# Patient Record
Sex: FEMALE | Race: WHITE | NOT HISPANIC OR LATINO
[De-identification: names, ages, dates, MRNs, and addresses within clinical notes are randomized per-mention and may not be internally consistent; named-entity substitution may affect disease eponyms.]

---

## 2017-10-26 ENCOUNTER — RESULT REVIEW (OUTPATIENT)
Age: 32
End: 2017-10-26

## 2018-03-05 ENCOUNTER — APPOINTMENT (OUTPATIENT)
Dept: ANTEPARTUM | Facility: CLINIC | Age: 33
End: 2018-03-05
Payer: COMMERCIAL

## 2018-03-05 ENCOUNTER — ASOB RESULT (OUTPATIENT)
Age: 33
End: 2018-03-05

## 2018-03-05 PROCEDURE — 76801 OB US < 14 WKS SINGLE FETUS: CPT

## 2018-03-05 PROCEDURE — 36416 COLLJ CAPILLARY BLOOD SPEC: CPT

## 2018-03-05 PROCEDURE — 76813 OB US NUCHAL MEAS 1 GEST: CPT

## 2018-05-02 ENCOUNTER — APPOINTMENT (OUTPATIENT)
Dept: ANTEPARTUM | Facility: CLINIC | Age: 33
End: 2018-05-02

## 2018-05-16 ENCOUNTER — ASOB RESULT (OUTPATIENT)
Age: 33
End: 2018-05-16

## 2018-05-16 ENCOUNTER — APPOINTMENT (OUTPATIENT)
Dept: ANTEPARTUM | Facility: CLINIC | Age: 33
End: 2018-05-16
Payer: COMMERCIAL

## 2018-05-16 PROCEDURE — 76805 OB US >/= 14 WKS SNGL FETUS: CPT

## 2018-07-18 PROBLEM — Z00.00 ENCOUNTER FOR PREVENTIVE HEALTH EXAMINATION: Status: ACTIVE | Noted: 2018-07-18

## 2018-09-09 ENCOUNTER — TRANSCRIPTION ENCOUNTER (OUTPATIENT)
Age: 33
End: 2018-09-09

## 2018-09-10 ENCOUNTER — TRANSCRIPTION ENCOUNTER (OUTPATIENT)
Age: 33
End: 2018-09-10

## 2018-09-10 ENCOUNTER — RESULT REVIEW (OUTPATIENT)
Age: 33
End: 2018-09-10

## 2018-09-10 ENCOUNTER — INPATIENT (INPATIENT)
Facility: HOSPITAL | Age: 33
LOS: 2 days | Discharge: HOME | End: 2018-09-13
Attending: OBSTETRICS & GYNECOLOGY | Admitting: OBSTETRICS & GYNECOLOGY
Payer: COMMERCIAL

## 2018-09-10 VITALS — HEIGHT: 63 IN | WEIGHT: 160.94 LBS

## 2018-09-10 LAB
BASOPHILS # BLD AUTO: 0.03 K/UL — SIGNIFICANT CHANGE UP (ref 0–0.2)
BASOPHILS NFR BLD AUTO: 0.3 % — SIGNIFICANT CHANGE UP (ref 0–2)
BLD GP AB SCN SERPL QL: NEGATIVE — SIGNIFICANT CHANGE UP
EOSINOPHIL # BLD AUTO: 0.13 K/UL — SIGNIFICANT CHANGE UP (ref 0–0.5)
EOSINOPHIL NFR BLD AUTO: 1.5 % — SIGNIFICANT CHANGE UP (ref 0–6)
HCT VFR BLD CALC: 37.3 % — SIGNIFICANT CHANGE UP (ref 34.5–45)
HCT VFR BLD CALC: 37.5 % — SIGNIFICANT CHANGE UP (ref 34.5–45)
HCT VFR BLD CALC: 37.6 % — SIGNIFICANT CHANGE UP (ref 34.5–45)
HGB BLD-MCNC: 12.3 G/DL — SIGNIFICANT CHANGE UP (ref 11.5–15.5)
HGB BLD-MCNC: 12.6 G/DL — SIGNIFICANT CHANGE UP (ref 11.5–15.5)
HGB BLD-MCNC: 12.6 G/DL — SIGNIFICANT CHANGE UP (ref 11.5–15.5)
IMM GRANULOCYTES # BLD AUTO: 0.03 # — SIGNIFICANT CHANGE UP
IMM GRANULOCYTES NFR BLD AUTO: 0.3 % — SIGNIFICANT CHANGE UP (ref 0–1.5)
LYMPHOCYTES # BLD AUTO: 1.65 K/UL — SIGNIFICANT CHANGE UP (ref 1–3.3)
LYMPHOCYTES # BLD AUTO: 18.5 % — SIGNIFICANT CHANGE UP (ref 13–44)
MCHC RBC-ENTMCNC: 28.4 PG — SIGNIFICANT CHANGE UP (ref 27–34)
MCHC RBC-ENTMCNC: 29.1 PG — SIGNIFICANT CHANGE UP (ref 27–34)
MCHC RBC-ENTMCNC: 29.2 PG — SIGNIFICANT CHANGE UP (ref 27–34)
MCHC RBC-ENTMCNC: 32.8 % — SIGNIFICANT CHANGE UP (ref 32–36)
MCHC RBC-ENTMCNC: 33.5 % — SIGNIFICANT CHANGE UP (ref 32–36)
MCHC RBC-ENTMCNC: 33.8 % — SIGNIFICANT CHANGE UP (ref 32–36)
MCV RBC AUTO: 86.5 FL — SIGNIFICANT CHANGE UP (ref 80–100)
MCV RBC AUTO: 86.6 FL — SIGNIFICANT CHANGE UP (ref 80–100)
MCV RBC AUTO: 86.8 FL — SIGNIFICANT CHANGE UP (ref 80–100)
MONOCYTES # BLD AUTO: 1.07 K/UL — HIGH (ref 0–0.9)
MONOCYTES NFR BLD AUTO: 12 % — SIGNIFICANT CHANGE UP (ref 2–14)
NEUTROPHILS # BLD AUTO: 6.01 K/UL — SIGNIFICANT CHANGE UP (ref 1.8–7.4)
NEUTROPHILS NFR BLD AUTO: 67.4 % — SIGNIFICANT CHANGE UP (ref 43–77)
NRBC # FLD: 0 — SIGNIFICANT CHANGE UP
PLATELET # BLD AUTO: 166 K/UL — SIGNIFICANT CHANGE UP (ref 150–400)
PLATELET # BLD AUTO: 170 K/UL — SIGNIFICANT CHANGE UP (ref 150–400)
PLATELET # BLD AUTO: 191 K/UL — SIGNIFICANT CHANGE UP (ref 150–400)
PMV BLD: 10.9 FL — SIGNIFICANT CHANGE UP (ref 7–13)
PMV BLD: 11.1 FL — SIGNIFICANT CHANGE UP (ref 7–13)
PMV BLD: 11.2 FL — SIGNIFICANT CHANGE UP (ref 7–13)
RBC # BLD: 4.31 M/UL — SIGNIFICANT CHANGE UP (ref 3.8–5.2)
RBC # BLD: 4.33 M/UL — SIGNIFICANT CHANGE UP (ref 3.8–5.2)
RBC # BLD: 4.33 M/UL — SIGNIFICANT CHANGE UP (ref 3.8–5.2)
RBC # FLD: 13.4 % — SIGNIFICANT CHANGE UP (ref 10.3–14.5)
RBC # FLD: 13.5 % — SIGNIFICANT CHANGE UP (ref 10.3–14.5)
RBC # FLD: 13.5 % — SIGNIFICANT CHANGE UP (ref 10.3–14.5)
RH IG SCN BLD-IMP: POSITIVE — SIGNIFICANT CHANGE UP
RH IG SCN BLD-IMP: POSITIVE — SIGNIFICANT CHANGE UP
WBC # BLD: 11.22 K/UL — HIGH (ref 3.8–10.5)
WBC # BLD: 11.8 K/UL — HIGH (ref 3.8–10.5)
WBC # BLD: 8.92 K/UL — SIGNIFICANT CHANGE UP (ref 3.8–10.5)
WBC # FLD AUTO: 11.22 K/UL — HIGH (ref 3.8–10.5)
WBC # FLD AUTO: 11.8 K/UL — HIGH (ref 3.8–10.5)
WBC # FLD AUTO: 8.92 K/UL — SIGNIFICANT CHANGE UP (ref 3.8–10.5)

## 2018-09-10 PROCEDURE — 88305 TISSUE EXAM BY PATHOLOGIST: CPT | Mod: 26

## 2018-09-10 RX ORDER — OXYTOCIN 10 UNIT/ML
41.67 VIAL (ML) INJECTION
Qty: 20 | Refills: 0 | Status: DISCONTINUED | OUTPATIENT
Start: 2018-09-10 | End: 2018-09-10

## 2018-09-10 RX ORDER — OXYTOCIN 10 UNIT/ML
333.33 VIAL (ML) INJECTION
Qty: 20 | Refills: 0 | Status: DISCONTINUED | OUTPATIENT
Start: 2018-09-10 | End: 2018-09-10

## 2018-09-10 RX ORDER — KETOROLAC TROMETHAMINE 30 MG/ML
30 SYRINGE (ML) INJECTION EVERY 6 HOURS
Qty: 0 | Refills: 0 | Status: DISCONTINUED | OUTPATIENT
Start: 2018-09-10 | End: 2018-09-11

## 2018-09-10 RX ORDER — HYDROMORPHONE HYDROCHLORIDE 2 MG/ML
1 INJECTION INTRAMUSCULAR; INTRAVENOUS; SUBCUTANEOUS
Qty: 0 | Refills: 0 | Status: DISCONTINUED | OUTPATIENT
Start: 2018-09-10 | End: 2018-09-10

## 2018-09-10 RX ORDER — FAMOTIDINE 10 MG/ML
20 INJECTION INTRAVENOUS ONCE
Qty: 0 | Refills: 0 | Status: COMPLETED | OUTPATIENT
Start: 2018-09-10 | End: 2018-09-10

## 2018-09-10 RX ORDER — SODIUM CHLORIDE 9 MG/ML
1000 INJECTION, SOLUTION INTRAVENOUS
Qty: 0 | Refills: 0 | Status: DISCONTINUED | OUTPATIENT
Start: 2018-09-10 | End: 2018-09-10

## 2018-09-10 RX ORDER — TETANUS TOXOID, REDUCED DIPHTHERIA TOXOID AND ACELLULAR PERTUSSIS VACCINE, ADSORBED 5; 2.5; 8; 8; 2.5 [IU]/.5ML; [IU]/.5ML; UG/.5ML; UG/.5ML; UG/.5ML
0.5 SUSPENSION INTRAMUSCULAR ONCE
Qty: 0 | Refills: 0 | Status: DISCONTINUED | OUTPATIENT
Start: 2018-09-10 | End: 2018-09-13

## 2018-09-10 RX ORDER — LANOLIN
1 OINTMENT (GRAM) TOPICAL
Qty: 0 | Refills: 0 | Status: DISCONTINUED | OUTPATIENT
Start: 2018-09-10 | End: 2018-09-13

## 2018-09-10 RX ORDER — FERROUS SULFATE 325(65) MG
325 TABLET ORAL DAILY
Qty: 0 | Refills: 0 | Status: DISCONTINUED | OUTPATIENT
Start: 2018-09-10 | End: 2018-09-13

## 2018-09-10 RX ORDER — DIPHENHYDRAMINE HCL 50 MG
25 CAPSULE ORAL EVERY 6 HOURS
Qty: 0 | Refills: 0 | Status: DISCONTINUED | OUTPATIENT
Start: 2018-09-10 | End: 2018-09-13

## 2018-09-10 RX ORDER — SODIUM CHLORIDE 9 MG/ML
1000 INJECTION, SOLUTION INTRAVENOUS
Qty: 0 | Refills: 0 | Status: DISCONTINUED | OUTPATIENT
Start: 2018-09-10 | End: 2018-09-11

## 2018-09-10 RX ORDER — GLYCERIN ADULT
1 SUPPOSITORY, RECTAL RECTAL AT BEDTIME
Qty: 0 | Refills: 0 | Status: DISCONTINUED | OUTPATIENT
Start: 2018-09-10 | End: 2018-09-13

## 2018-09-10 RX ORDER — HYDROMORPHONE HYDROCHLORIDE 2 MG/ML
1 INJECTION INTRAMUSCULAR; INTRAVENOUS; SUBCUTANEOUS
Qty: 0 | Refills: 0 | Status: DISCONTINUED | OUTPATIENT
Start: 2018-09-10 | End: 2018-09-11

## 2018-09-10 RX ORDER — ACETAMINOPHEN 500 MG
975 TABLET ORAL EVERY 6 HOURS
Qty: 0 | Refills: 0 | Status: DISCONTINUED | OUTPATIENT
Start: 2018-09-10 | End: 2018-09-13

## 2018-09-10 RX ORDER — SIMETHICONE 80 MG/1
80 TABLET, CHEWABLE ORAL EVERY 4 HOURS
Qty: 0 | Refills: 0 | Status: DISCONTINUED | OUTPATIENT
Start: 2018-09-10 | End: 2018-09-13

## 2018-09-10 RX ORDER — OXYCODONE HYDROCHLORIDE 5 MG/1
5 TABLET ORAL EVERY 4 HOURS
Qty: 0 | Refills: 0 | Status: DISCONTINUED | OUTPATIENT
Start: 2018-09-10 | End: 2018-09-13

## 2018-09-10 RX ORDER — BUTORPHANOL TARTRATE 2 MG/ML
0.12 INJECTION, SOLUTION INTRAMUSCULAR; INTRAVENOUS EVERY 6 HOURS
Qty: 0 | Refills: 0 | Status: DISCONTINUED | OUTPATIENT
Start: 2018-09-10 | End: 2018-09-11

## 2018-09-10 RX ORDER — ONDANSETRON 8 MG/1
4 TABLET, FILM COATED ORAL EVERY 6 HOURS
Qty: 0 | Refills: 0 | Status: DISCONTINUED | OUTPATIENT
Start: 2018-09-10 | End: 2018-09-11

## 2018-09-10 RX ORDER — ONDANSETRON 8 MG/1
4 TABLET, FILM COATED ORAL ONCE
Qty: 0 | Refills: 0 | Status: DISCONTINUED | OUTPATIENT
Start: 2018-09-10 | End: 2018-09-10

## 2018-09-10 RX ORDER — IBUPROFEN 200 MG
600 TABLET ORAL EVERY 6 HOURS
Qty: 0 | Refills: 0 | Status: COMPLETED | OUTPATIENT
Start: 2018-09-10 | End: 2019-08-09

## 2018-09-10 RX ORDER — DOCUSATE SODIUM 100 MG
100 CAPSULE ORAL
Qty: 0 | Refills: 0 | Status: DISCONTINUED | OUTPATIENT
Start: 2018-09-10 | End: 2018-09-13

## 2018-09-10 RX ORDER — ACETAMINOPHEN 500 MG
1000 TABLET ORAL ONCE
Qty: 0 | Refills: 0 | Status: COMPLETED | OUTPATIENT
Start: 2018-09-10 | End: 2018-09-10

## 2018-09-10 RX ORDER — CITRIC ACID/SODIUM CITRATE 300-500 MG
30 SOLUTION, ORAL ORAL ONCE
Qty: 0 | Refills: 0 | Status: COMPLETED | OUTPATIENT
Start: 2018-09-10 | End: 2018-09-10

## 2018-09-10 RX ORDER — OXYCODONE HYDROCHLORIDE 5 MG/1
5 TABLET ORAL
Qty: 0 | Refills: 0 | Status: COMPLETED | OUTPATIENT
Start: 2018-09-10 | End: 2018-09-17

## 2018-09-10 RX ORDER — HEPARIN SODIUM 5000 [USP'U]/ML
5000 INJECTION INTRAVENOUS; SUBCUTANEOUS EVERY 12 HOURS
Qty: 0 | Refills: 0 | Status: DISCONTINUED | OUTPATIENT
Start: 2018-09-10 | End: 2018-09-13

## 2018-09-10 RX ORDER — METOCLOPRAMIDE HCL 10 MG
10 TABLET ORAL ONCE
Qty: 0 | Refills: 0 | Status: COMPLETED | OUTPATIENT
Start: 2018-09-10 | End: 2018-09-10

## 2018-09-10 RX ORDER — HYDROMORPHONE HYDROCHLORIDE 2 MG/ML
0.5 INJECTION INTRAMUSCULAR; INTRAVENOUS; SUBCUTANEOUS
Qty: 0 | Refills: 0 | Status: DISCONTINUED | OUTPATIENT
Start: 2018-09-10 | End: 2018-09-10

## 2018-09-10 RX ORDER — SODIUM CHLORIDE 9 MG/ML
500 INJECTION, SOLUTION INTRAVENOUS ONCE
Qty: 0 | Refills: 0 | Status: COMPLETED | OUTPATIENT
Start: 2018-09-10 | End: 2018-09-10

## 2018-09-10 RX ORDER — OXYCODONE HYDROCHLORIDE 5 MG/1
10 TABLET ORAL
Qty: 0 | Refills: 0 | Status: DISCONTINUED | OUTPATIENT
Start: 2018-09-10 | End: 2018-09-11

## 2018-09-10 RX ORDER — OXYCODONE HYDROCHLORIDE 5 MG/1
5 TABLET ORAL
Qty: 0 | Refills: 0 | Status: DISCONTINUED | OUTPATIENT
Start: 2018-09-10 | End: 2018-09-11

## 2018-09-10 RX ORDER — NALOXONE HYDROCHLORIDE 4 MG/.1ML
0.1 SPRAY NASAL
Qty: 0 | Refills: 0 | Status: DISCONTINUED | OUTPATIENT
Start: 2018-09-10 | End: 2018-09-11

## 2018-09-10 RX ORDER — SODIUM CHLORIDE 9 MG/ML
1000 INJECTION, SOLUTION INTRAVENOUS ONCE
Qty: 0 | Refills: 0 | Status: COMPLETED | OUTPATIENT
Start: 2018-09-10 | End: 2018-09-10

## 2018-09-10 RX ADMIN — OXYCODONE HYDROCHLORIDE 5 MILLIGRAM(S): 5 TABLET ORAL at 17:06

## 2018-09-10 RX ADMIN — Medication 0.2 MILLIGRAM(S): at 21:45

## 2018-09-10 RX ADMIN — Medication 975 MILLIGRAM(S): at 21:43

## 2018-09-10 RX ADMIN — HYDROMORPHONE HYDROCHLORIDE 1 MILLIGRAM(S): 2 INJECTION INTRAMUSCULAR; INTRAVENOUS; SUBCUTANEOUS at 14:31

## 2018-09-10 RX ADMIN — Medication 325 MILLIGRAM(S): at 21:45

## 2018-09-10 RX ADMIN — OXYCODONE HYDROCHLORIDE 10 MILLIGRAM(S): 5 TABLET ORAL at 23:15

## 2018-09-10 RX ADMIN — SIMETHICONE 80 MILLIGRAM(S): 80 TABLET, CHEWABLE ORAL at 17:06

## 2018-09-10 RX ADMIN — HYDROMORPHONE HYDROCHLORIDE 1 MILLIGRAM(S): 2 INJECTION INTRAMUSCULAR; INTRAVENOUS; SUBCUTANEOUS at 14:15

## 2018-09-10 RX ADMIN — Medication 0.2 MILLIGRAM(S): at 17:05

## 2018-09-10 RX ADMIN — Medication 30 MILLILITER(S): at 06:46

## 2018-09-10 RX ADMIN — Medication 0.2 MILLIGRAM(S): at 13:14

## 2018-09-10 RX ADMIN — SODIUM CHLORIDE 2000 MILLILITER(S): 9 INJECTION, SOLUTION INTRAVENOUS at 06:46

## 2018-09-10 RX ADMIN — Medication 30 MILLIGRAM(S): at 19:35

## 2018-09-10 RX ADMIN — Medication 400 MILLIGRAM(S): at 12:45

## 2018-09-10 RX ADMIN — SODIUM CHLORIDE 1000 MILLILITER(S): 9 INJECTION, SOLUTION INTRAVENOUS at 23:09

## 2018-09-10 RX ADMIN — HYDROMORPHONE HYDROCHLORIDE 1 MILLIGRAM(S): 2 INJECTION INTRAMUSCULAR; INTRAVENOUS; SUBCUTANEOUS at 11:15

## 2018-09-10 RX ADMIN — SODIUM CHLORIDE 75 MILLILITER(S): 9 INJECTION, SOLUTION INTRAVENOUS at 13:11

## 2018-09-10 RX ADMIN — FAMOTIDINE 20 MILLIGRAM(S): 10 INJECTION INTRAVENOUS at 06:46

## 2018-09-10 RX ADMIN — Medication 30 MILLIGRAM(S): at 19:19

## 2018-09-10 RX ADMIN — SIMETHICONE 80 MILLIGRAM(S): 80 TABLET, CHEWABLE ORAL at 22:45

## 2018-09-10 RX ADMIN — Medication 125 MILLIUNIT(S)/MIN: at 13:11

## 2018-09-10 RX ADMIN — OXYCODONE HYDROCHLORIDE 10 MILLIGRAM(S): 5 TABLET ORAL at 22:45

## 2018-09-10 RX ADMIN — Medication 1000 MILLIGRAM(S): at 13:00

## 2018-09-10 RX ADMIN — SODIUM CHLORIDE 125 MILLILITER(S): 9 INJECTION, SOLUTION INTRAVENOUS at 06:46

## 2018-09-10 RX ADMIN — Medication 10 MILLIGRAM(S): at 06:46

## 2018-09-10 RX ADMIN — OXYCODONE HYDROCHLORIDE 5 MILLIGRAM(S): 5 TABLET ORAL at 17:35

## 2018-09-10 RX ADMIN — HYDROMORPHONE HYDROCHLORIDE 1 MILLIGRAM(S): 2 INJECTION INTRAMUSCULAR; INTRAVENOUS; SUBCUTANEOUS at 11:30

## 2018-09-10 RX ADMIN — Medication 975 MILLIGRAM(S): at 22:13

## 2018-09-10 RX ADMIN — HEPARIN SODIUM 5000 UNIT(S): 5000 INJECTION INTRAVENOUS; SUBCUTANEOUS at 17:06

## 2018-09-10 NOTE — DISCHARGE NOTE OB - PATIENT PORTAL LINK FT
You can access the [facility] Patient Portal, offered by [facility], by registering with the following website: [email or web address]

## 2018-09-10 NOTE — CHART NOTE - NSCHARTNOTEFT_GEN_A_CORE
Patient is s/p pLTCS for breech.  .  Patient on methergine series for uterine atony.  Patient evaluated due to having an episode of dizziness when getting back to the bed from sitting in chair.  Patient denies HA, lightheadedness, CP/SOB, n/v.  Patient is ambulating with assistance. Patient's ghosh is draining adequately.  Has not passed gas, and tolerating PO intake.  Orthostatics negative.   FingerstickL 88mg/dl    Vital Signs Last 24 Hrs  T(C): 36.9 (10 Sep 2018 21:33), Max: 36.9 (10 Sep 2018 21:33)  T(F): 98.5 (10 Sep 2018 21:33), Max: 98.5 (10 Sep 2018 21:33)  HR: 96 (10 Sep 2018 21:) (82 - 96)  BP: 94/59 (10 Sep 2018 21:33) (94/59 - 123/78)  BP(mean): 83 (10 Sep 2018 13:00) (70 - 98)  RR: 18 (10 Sep 2018 21:33) (13 - 19)  SpO2: 100% (10 Sep 2018 21:33) (96% - 100%)                                    12.3   11.22 )-----------( 166      ( 10 Sep 2018 18:09 )             37.5                         12.6   11.80 )-----------( 170      ( 10 Sep 2018 12:45 )             37.3                         12.6   8.92  )-----------( 191      ( 10 Sep 2018 06:10 )             37.6         Physical Exam:   General: patient standing at bedside getting orthostatics done.  NAD   CV: RRR  Lungs: CTA b/l, good air flow b/l   Abdomen: fundus firm, appropriately tender, non distended.     Incision c/d/i original dressing   Vaginal: scant vaginal blood on pad.  No active vaginal bleeding  Extremities: non tender b/l, no edema.      Assessment:   Patient is age 32   POD#0  w/ 1 episode of dizziness.  Currently no signs/symptoms of anemia. FS: 88mg/dl.  Patient was given apple juice, in which patient stated she felt better after consuming.      Plan:  - Bolus  cc  - CBC Stat  - Monitor for Vitals signs  - Encouraged p.o. intake    Crissy CEDILLO Patient is s/p pLTCS for breech.  .  Patient on methergine series for uterine atony.  Patient evaluated due to having an episode of dizziness when getting back to the bed from sitting in chair.  Patient denies HA, lightheadedness, CP/SOB, n/v.  Patient is ambulating with assistance. Patient's ghosh is draining adequately.  Has not passed gas, and tolerating PO intake.  Orthostatics negative.   Fingerstick: 88mg/dl    Vital Signs Last 24 Hrs  T(C): 36.9 (10 Sep 2018 21:33), Max: 36.9 (10 Sep 2018 21:33)  T(F): 98.5 (10 Sep 2018 21:33), Max: 98.5 (10 Sep 2018 21:33)  HR: 96 (10 Sep 2018 21:) (82 - 96)  BP: 94/59 (10 Sep 2018 21:33) (94/59 - 123/78)  BP(mean): 83 (10 Sep 2018 13:00) (70 - 98)  RR: 18 (10 Sep 2018 21:33) (13 - 19)  SpO2: 100% (10 Sep 2018 21:33) (96% - 100%)                                    12.3   11.22 )-----------( 166      ( 10 Sep 2018 18:09 )             37.5                         12.6   11.80 )-----------( 170      ( 10 Sep 2018 12:45 )             37.3                         12.6   8.92  )-----------( 191      ( 10 Sep 2018 06:10 )             37.6         Physical Exam:   General: patient standing at bedside getting orthostatics done.  NAD   CV: RRR  Lungs: CTA b/l, good air flow b/l   Abdomen: fundus firm, appropriately tender, non distended.     Incision c/d/i original dressing   Vaginal: scant vaginal blood on pad.  No active vaginal bleeding  Extremities: non tender b/l, no edema.      Assessment:   Patient is age 32   POD#0  w/ 1 episode of dizziness.  Currently no signs/symptoms of anemia. FS: 88mg/dl.  Patient was given apple juice, in which patient stated she felt better after consuming.      Plan:  - Bolus  cc  - CBC Stat  - Monitor for Vitals signs  - Encouraged p.o. intake    Crissy MANZONP

## 2018-09-10 NOTE — DISCHARGE NOTE OB - CARE PROVIDER_API CALL
Babs Dior), Gynecology Obstetrics  Gynecology  [address] [place] [address]  Phone: [phone]  Fax: [phone]

## 2018-09-10 NOTE — DISCHARGE NOTE OB - CARE PLAN
Principal Discharge DX:	 delivery delivered  Goal:	postoperative recovery  Assessment and plan of treatment:	routine postoperative care

## 2018-09-10 NOTE — DISCHARGE NOTE OB - MATERIALS PROVIDED
Vaccinations/Breastfeeding Log/Guide to Postpartum Care/Discharge Medication Information for Patients and Families Pocket Guide/  Immunization Record/Shaken Baby Prevention Handout/Breastfeeding Guide and Packet/[facility]  Screening Program/Breastfeeding Mother’s Support Group Information/[facility] Hearing Screen Program/Birth Certificate Instructions/Back To Sleep Handout

## 2018-09-11 LAB
BASOPHILS # BLD AUTO: 0.04 K/UL — SIGNIFICANT CHANGE UP (ref 0–0.2)
BASOPHILS NFR BLD AUTO: 0.3 % — SIGNIFICANT CHANGE UP (ref 0–2)
EOSINOPHIL # BLD AUTO: 0.08 K/UL — SIGNIFICANT CHANGE UP (ref 0–0.5)
EOSINOPHIL NFR BLD AUTO: 0.7 % — SIGNIFICANT CHANGE UP (ref 0–6)
HCT VFR BLD CALC: 38.9 % — SIGNIFICANT CHANGE UP (ref 34.5–45)
HCT VFR BLD CALC: 39.2 % — SIGNIFICANT CHANGE UP (ref 34.5–45)
HGB BLD-MCNC: 12.6 G/DL — SIGNIFICANT CHANGE UP (ref 11.5–15.5)
HGB BLD-MCNC: 13 G/DL — SIGNIFICANT CHANGE UP (ref 11.5–15.5)
IMM GRANULOCYTES # BLD AUTO: 0.04 # — SIGNIFICANT CHANGE UP
IMM GRANULOCYTES NFR BLD AUTO: 0.3 % — SIGNIFICANT CHANGE UP (ref 0–1.5)
LYMPHOCYTES # BLD AUTO: 1.12 K/UL — SIGNIFICANT CHANGE UP (ref 1–3.3)
LYMPHOCYTES # BLD AUTO: 9.1 % — LOW (ref 13–44)
MCHC RBC-ENTMCNC: 28.2 PG — SIGNIFICANT CHANGE UP (ref 27–34)
MCHC RBC-ENTMCNC: 29.5 PG — SIGNIFICANT CHANGE UP (ref 27–34)
MCHC RBC-ENTMCNC: 32.1 % — SIGNIFICANT CHANGE UP (ref 32–36)
MCHC RBC-ENTMCNC: 33.4 % — SIGNIFICANT CHANGE UP (ref 32–36)
MCV RBC AUTO: 87.7 FL — SIGNIFICANT CHANGE UP (ref 80–100)
MCV RBC AUTO: 88.4 FL — SIGNIFICANT CHANGE UP (ref 80–100)
MONOCYTES # BLD AUTO: 0.94 K/UL — HIGH (ref 0–0.9)
MONOCYTES NFR BLD AUTO: 7.7 % — SIGNIFICANT CHANGE UP (ref 2–14)
NEUTROPHILS # BLD AUTO: 10.05 K/UL — HIGH (ref 1.8–7.4)
NEUTROPHILS NFR BLD AUTO: 81.9 % — HIGH (ref 43–77)
NRBC # FLD: 0 — SIGNIFICANT CHANGE UP
NRBC # FLD: 0 — SIGNIFICANT CHANGE UP
PLATELET # BLD AUTO: 168 K/UL — SIGNIFICANT CHANGE UP (ref 150–400)
PLATELET # BLD AUTO: 181 K/UL — SIGNIFICANT CHANGE UP (ref 150–400)
PMV BLD: 11.1 FL — SIGNIFICANT CHANGE UP (ref 7–13)
PMV BLD: 11.3 FL — SIGNIFICANT CHANGE UP (ref 7–13)
RBC # BLD: 4.4 M/UL — SIGNIFICANT CHANGE UP (ref 3.8–5.2)
RBC # BLD: 4.47 M/UL — SIGNIFICANT CHANGE UP (ref 3.8–5.2)
RBC # FLD: 13.6 % — SIGNIFICANT CHANGE UP (ref 10.3–14.5)
RBC # FLD: 13.7 % — SIGNIFICANT CHANGE UP (ref 10.3–14.5)
T PALLIDUM AB TITR SER: NEGATIVE — SIGNIFICANT CHANGE UP
WBC # BLD: 12.27 K/UL — HIGH (ref 3.8–10.5)
WBC # BLD: 13.28 K/UL — HIGH (ref 3.8–10.5)
WBC # FLD AUTO: 12.27 K/UL — HIGH (ref 3.8–10.5)
WBC # FLD AUTO: 13.28 K/UL — HIGH (ref 3.8–10.5)

## 2018-09-11 RX ORDER — OXYCODONE HYDROCHLORIDE 5 MG/1
5 TABLET ORAL
Qty: 0 | Refills: 0 | Status: DISCONTINUED | OUTPATIENT
Start: 2018-09-11 | End: 2018-09-13

## 2018-09-11 RX ORDER — SODIUM CHLORIDE 9 MG/ML
1000 INJECTION, SOLUTION INTRAVENOUS ONCE
Qty: 0 | Refills: 0 | Status: COMPLETED | OUTPATIENT
Start: 2018-09-11 | End: 2018-09-11

## 2018-09-11 RX ORDER — OXYCODONE HYDROCHLORIDE 5 MG/1
5 TABLET ORAL
Qty: 0 | Refills: 0 | Status: DISCONTINUED | OUTPATIENT
Start: 2018-09-11 | End: 2018-09-11

## 2018-09-11 RX ORDER — IBUPROFEN 200 MG
600 TABLET ORAL EVERY 6 HOURS
Qty: 0 | Refills: 0 | Status: DISCONTINUED | OUTPATIENT
Start: 2018-09-11 | End: 2018-09-13

## 2018-09-11 RX ADMIN — Medication 30 MILLIGRAM(S): at 01:56

## 2018-09-11 RX ADMIN — Medication 0.2 MILLIGRAM(S): at 01:26

## 2018-09-11 RX ADMIN — HEPARIN SODIUM 5000 UNIT(S): 5000 INJECTION INTRAVENOUS; SUBCUTANEOUS at 17:17

## 2018-09-11 RX ADMIN — Medication 100 MILLIGRAM(S): at 09:52

## 2018-09-11 RX ADMIN — HEPARIN SODIUM 5000 UNIT(S): 5000 INJECTION INTRAVENOUS; SUBCUTANEOUS at 06:50

## 2018-09-11 RX ADMIN — Medication 1 TABLET(S): at 15:42

## 2018-09-11 RX ADMIN — OXYCODONE HYDROCHLORIDE 5 MILLIGRAM(S): 5 TABLET ORAL at 16:15

## 2018-09-11 RX ADMIN — Medication 30 MILLIGRAM(S): at 06:49

## 2018-09-11 RX ADMIN — OXYCODONE HYDROCHLORIDE 5 MILLIGRAM(S): 5 TABLET ORAL at 10:30

## 2018-09-11 RX ADMIN — OXYCODONE HYDROCHLORIDE 5 MILLIGRAM(S): 5 TABLET ORAL at 23:30

## 2018-09-11 RX ADMIN — SIMETHICONE 80 MILLIGRAM(S): 80 TABLET, CHEWABLE ORAL at 15:43

## 2018-09-11 RX ADMIN — Medication 30 MILLIGRAM(S): at 01:26

## 2018-09-11 RX ADMIN — Medication 600 MILLIGRAM(S): at 17:18

## 2018-09-11 RX ADMIN — Medication 975 MILLIGRAM(S): at 22:30

## 2018-09-11 RX ADMIN — Medication 30 MILLIGRAM(S): at 12:55

## 2018-09-11 RX ADMIN — Medication 975 MILLIGRAM(S): at 16:12

## 2018-09-11 RX ADMIN — Medication 0.2 MILLIGRAM(S): at 06:00

## 2018-09-11 RX ADMIN — OXYCODONE HYDROCHLORIDE 5 MILLIGRAM(S): 5 TABLET ORAL at 09:52

## 2018-09-11 RX ADMIN — Medication 100 MILLIGRAM(S): at 17:18

## 2018-09-11 RX ADMIN — Medication 30 MILLIGRAM(S): at 07:00

## 2018-09-11 RX ADMIN — OXYCODONE HYDROCHLORIDE 5 MILLIGRAM(S): 5 TABLET ORAL at 15:43

## 2018-09-11 RX ADMIN — Medication 600 MILLIGRAM(S): at 17:45

## 2018-09-11 RX ADMIN — Medication 30 MILLIGRAM(S): at 11:54

## 2018-09-11 RX ADMIN — Medication 975 MILLIGRAM(S): at 15:42

## 2018-09-11 RX ADMIN — Medication 975 MILLIGRAM(S): at 23:00

## 2018-09-11 RX ADMIN — OXYCODONE HYDROCHLORIDE 5 MILLIGRAM(S): 5 TABLET ORAL at 21:00

## 2018-09-11 RX ADMIN — SODIUM CHLORIDE 2000 MILLILITER(S): 9 INJECTION, SOLUTION INTRAVENOUS at 02:53

## 2018-09-11 RX ADMIN — Medication 975 MILLIGRAM(S): at 06:05

## 2018-09-11 RX ADMIN — SIMETHICONE 80 MILLIGRAM(S): 80 TABLET, CHEWABLE ORAL at 09:52

## 2018-09-11 RX ADMIN — OXYCODONE HYDROCHLORIDE 5 MILLIGRAM(S): 5 TABLET ORAL at 20:30

## 2018-09-11 RX ADMIN — Medication 600 MILLIGRAM(S): at 23:30

## 2018-09-11 NOTE — PROVIDER CONTACT NOTE (OTHER) - ASSESSMENT
lungs clear, urine pale yellow and large, lips dry, patient is constantly drinking water & complains of thirst

## 2018-09-11 NOTE — PROGRESS NOTE ADULT - SUBJECTIVE AND OBJECTIVE BOX
Postop Day  __1_ s/p   C- Section    THERAPY:  [ x ] Spinal morphine   [  ] Epidural morphine   [  ] IV PCA Hydromorphone 1 mg/ml    T(C): 37 (09-11-18 @ 14:25), Max: 37 (09-11-18 @ 14:25)  HR: 91 (09-11-18 @ 15:30) (91 - 99)  BP: 93/58 (09-11-18 @ 15:30) (93/58 - 100/58)  RR: 18 (09-11-18 @ 15:30) (18 - 18)  SpO2: 99% (09-11-18 @ 15:30) (99% - 100%)    MEDICATIONS  (STANDING):  acetaminophen   Tablet .. 975 milliGRAM(s) Oral every 6 hours  diphtheria/tetanus/pertussis (acellular) Vaccine (ADAcel) 0.5 milliLiter(s) IntraMuscular once  ferrous    sulfate 325 milliGRAM(s) Oral daily  heparin  Injectable 5000 Unit(s) SubCutaneous every 12 hours  ibuprofen  Tablet. 600 milliGRAM(s) Oral every 6 hours  oxyCODONE    IR 5 milliGRAM(s) Oral every 3 hours  prenatal multivitamin 1 Tablet(s) Oral daily    MEDICATIONS  (PRN):  diphenhydrAMINE   Capsule 25 milliGRAM(s) Oral every 6 hours PRN Itching  docusate sodium 100 milliGRAM(s) Oral two times a day PRN Stool Softening  glycerin Suppository - Adult 1 Suppository(s) Rectal at bedtime PRN Constipation  lanolin Ointment 1 Application(s) Topical every 3 hours PRN Sore Nipples  oxyCODONE    IR 5 milliGRAM(s) Oral every 4 hours PRN Severe Pain (7 - 10)  simethicone 80 milliGRAM(s) Chew every 4 hours PRN Gas      Pain:   _____mild______ at rest;  ______moderate_____with activity    Sedation Score:	  [ x ] Alert	    [  ] Drowsy        [  ] Arousable	[  ] Asleep	[  ] Unresponsive    Side Effects:	  [ x ] None	     [  ] Nausea        [  ] Pruritus        [  ] Weakness   [  ] Numbness        ASSESSMENT/ PLAN  [   ] Side effects resolving      [   ] Patient made aware of PRN meds available     [ x] Discontinue & switch to PRN pain medications        [  ] Continue       Patient states lower extremities feel and move normally. No apparent anesthetic complications.

## 2018-09-11 NOTE — CHART NOTE - NSCHARTNOTEFT_GEN_A_CORE
Called by RN for BP of 75/45 P 90.  Patient asymptomatic.  Urine output over 3 hours 2300cc.  Evaluated patient at bedside with  Dr. Hernandez.  Repeat vitals: 90/55 P 86    Plan   1 L bolus LR  Continue to monitor vitals Called by RN for BP of 75/45 P 90.  Patient asymptomatic.  Urine output over 3 hours 2300cc.  Evaluated patient at bedside with  Dr. Hernandez.  Repeat vitals: 90/55 P 86    Plan   1 L bolus LR  Continue to monitor vitals    Attending Note   Pt asymptomatic  Will give 1  L Bolus     Freida Hernandez MD MSc

## 2018-09-11 NOTE — PROGRESS NOTE ADULT - SUBJECTIVE AND OBJECTIVE BOX
Post-Operative Note, C/S  She is a  32y woman who is now post-operative day#1:     Subjective:  The patient feels well.  She is ambulating.   She is tolerating regular diet.  She denies nausea and vomiting; denies fever.  She is voiding.  Her pain is controlled; incisional pain is appropriate.  She reports normal postpartum bleeding.  She is breastfeeding.  She is formula feeding.    Physical exam:    Vital Signs Last 24 Hrs  T(C): 37.2 (11 Sep 2018 06:04), Max: 37.2 (11 Sep 2018 06:04)  T(F): 98.9 (11 Sep 2018 06:04), Max: 98.9 (11 Sep 2018 06:04)  HR: 91 (11 Sep 2018 06:04) (82 - 96)  BP: 114/65 (11 Sep 2018 06:04) (75/45 - 123/78)  BP(mean): 83 (10 Sep 2018 13:00) (70 - 98)  RR: 18 (11 Sep 2018 06:04) (13 - 19)  SpO2: 99% (11 Sep 2018 06:04) (96% - 100%)    Gen: NAD  Breast: Soft, nontender, not engorged.  Abdomen: Soft, nontender, no distension , firm uterine fundus at umbilicus.  Incision: C/D/I.  Pelvic: Normal lochia noted  Ext: No calf tenderness    LABS:                        12.6   13.28 )-----------( 168      ( 11 Sep 2018 06:00 )             39.2       Rubella status:     Allergies    No Known Allergies    Intolerances      MEDICATIONS  (STANDING):  acetaminophen   Tablet .. 975 milliGRAM(s) Oral every 6 hours  diphtheria/tetanus/pertussis (acellular) Vaccine (ADAcel) 0.5 milliLiter(s) IntraMuscular once  ferrous    sulfate 325 milliGRAM(s) Oral daily  heparin  Injectable 5000 Unit(s) SubCutaneous every 12 hours  ibuprofen  Tablet. 600 milliGRAM(s) Oral every 6 hours  ketorolac   Injectable 30 milliGRAM(s) IV Push every 6 hours  oxyCODONE    IR 5 milliGRAM(s) Oral every 3 hours  prenatal multivitamin 1 Tablet(s) Oral daily    MEDICATIONS  (PRN):  butorphanol Injectable 0.125 milliGRAM(s) IV Push every 6 hours PRN Pruritus  diphenhydrAMINE   Capsule 25 milliGRAM(s) Oral every 6 hours PRN Itching  docusate sodium 100 milliGRAM(s) Oral two times a day PRN Stool Softening  glycerin Suppository - Adult 1 Suppository(s) Rectal at bedtime PRN Constipation  HYDROmorphone  Injectable 1 milliGRAM(s) IV Push every 3 hours PRN Severe Pain (7 - 10)  lanolin Ointment 1 Application(s) Topical every 3 hours PRN Sore Nipples  naloxone Injectable 0.1 milliGRAM(s) IV Push every 3 minutes PRN For ANY of the following changes in patient status:  A. RR LESS THAN 10 breaths per minute, B. Oxygen saturation LESS THAN 90%, C. Sedation score of 6  ondansetron Injectable 4 milliGRAM(s) IV Push every 6 hours PRN Nausea  oxyCODONE    IR 5 milliGRAM(s) Oral every 3 hours PRN Mild Pain (1 - 3)  oxyCODONE    IR 10 milliGRAM(s) Oral every 3 hours PRN Moderate Pain (4 - 6)  oxyCODONE    IR 5 milliGRAM(s) Oral every 4 hours PRN Severe Pain (7 - 10)  simethicone 80 milliGRAM(s) Chew every 4 hours PRN Gas        Assessment and Plan  POD #1 s/p C/S.  Doing well.  Encourage ambulation.  Incisional care and PO instructions reviewed.  CPC.

## 2018-09-11 NOTE — LACTATION INITIAL EVALUATION - LACTATION INTERVENTIONS
initiate hand expression routine/initiate skin to skin/Assisted with positions and latch.  Assistance offered as needed.

## 2018-09-11 NOTE — PROGRESS NOTE ADULT - SUBJECTIVE AND OBJECTIVE BOX
ANESTHESIA POSTOP CHECK    32y Female POSTOP DAY 1     Vital Signs Last 24 Hrs  T(C): 37 (11 Sep 2018 14:25), Max: 37.2 (11 Sep 2018 06:04)  T(F): 98.6 (11 Sep 2018 14:25), Max: 98.9 (11 Sep 2018 06:04)  HR: 91 (11 Sep 2018 15:30) (84 - 99)  BP: 93/58 (11 Sep 2018 15:30) (75/45 - 114/65)  BP(mean): --  RR: 18 (11 Sep 2018 15:30) (18 - 19)  SpO2: 99% (11 Sep 2018 15:30) (98% - 100%)  I&O's Summary    10 Sep 2018 07:01  -  11 Sep 2018 07:00  --------------------------------------------------------  IN: 5525 mL / OUT: 8575 mL / NET: -3050 mL    11 Sep 2018 07:01  -  11 Sep 2018 16:04  --------------------------------------------------------  IN: 0 mL / OUT: 1000 mL / NET: -1000 mL        NO APPARENT ANESTHESIA COMPLICATIONS

## 2018-09-11 NOTE — PROVIDER CONTACT NOTE (OTHER) - RECOMMENDATIONS
Orthostats were negative, glucose was 88 (apple juice given), CBC drawn stat 13.0/38.9. 500ml Bolus given.

## 2018-09-11 NOTE — PROVIDER CONTACT NOTE (OTHER) - SITUATION
Increased urine output, increased thirst persists in spite of IV boluses, Pulse pressure of 30 -35 continues, patient has dry lips, c/o having leg cramp earlier

## 2018-09-11 NOTE — PROGRESS NOTE ADULT - SUBJECTIVE AND OBJECTIVE BOX
OB Progress Note:  Delivery, POD#1    S: 31yo POD#1 s/p LTCS . Her pain is well controlled. She is tolerating a regular diet and passing flatus. Denies N/V. Denies CP/SOB/lightheadedness/dizziness.   She is ambulating without difficulty. Voiding spontaneously.     O:   Vital Signs Last 24 Hrs  T(C): 37.2 (11 Sep 2018 06:04), Max: 37.2 (11 Sep 2018 06:04)  T(F): 98.9 (11 Sep 2018 06:04), Max: 98.9 (11 Sep 2018 06:04)  HR: 91 (11 Sep 2018 06:04) (82 - 96)  BP: 114/65 (11 Sep 2018 06:04) (75/45 - 123/78)  BP(mean): 83 (10 Sep 2018 13:00) (70 - 98)  RR: 18 (11 Sep 2018 06:04) (13 - 19)  SpO2: 99% (11 Sep 2018 06:04) (96% - 100%)    Labs:  Blood type: AB Positive  Rubella IgG: RPR: Negative                          12.6   13.28<H> >-----------< 168    (  @ 06:00 )             39.2                        13.0   12.27<H> >-----------< 181    ( 09-10 @ 23:10 )             38.9                        12.3   11.22<H> >-----------< 166    ( 09-10 @ 18:09 )             37.5                        12.6   11.80<H> >-----------< 170    ( 09-10 @ 12:45 )             37.3                        12.6   8.92 >-----------< 191    ( 09-10 @ 06:10 )             37.6        PE:  General: NAD  Abdomen: Mildly distended, appropriately tender, incision c/d/i.  Extremities: No erythema, no pitting edema

## 2018-09-12 RX ORDER — MAGNESIUM HYDROXIDE 400 MG/1
30 TABLET, CHEWABLE ORAL DAILY
Qty: 0 | Refills: 0 | Status: DISCONTINUED | OUTPATIENT
Start: 2018-09-12 | End: 2018-09-13

## 2018-09-12 RX ADMIN — OXYCODONE HYDROCHLORIDE 5 MILLIGRAM(S): 5 TABLET ORAL at 06:30

## 2018-09-12 RX ADMIN — Medication 975 MILLIGRAM(S): at 18:15

## 2018-09-12 RX ADMIN — Medication 600 MILLIGRAM(S): at 13:00

## 2018-09-12 RX ADMIN — SIMETHICONE 80 MILLIGRAM(S): 80 TABLET, CHEWABLE ORAL at 11:56

## 2018-09-12 RX ADMIN — Medication 975 MILLIGRAM(S): at 13:00

## 2018-09-12 RX ADMIN — Medication 600 MILLIGRAM(S): at 11:56

## 2018-09-12 RX ADMIN — OXYCODONE HYDROCHLORIDE 5 MILLIGRAM(S): 5 TABLET ORAL at 16:18

## 2018-09-12 RX ADMIN — OXYCODONE HYDROCHLORIDE 5 MILLIGRAM(S): 5 TABLET ORAL at 20:17

## 2018-09-12 RX ADMIN — Medication 1 TABLET(S): at 11:56

## 2018-09-12 RX ADMIN — HEPARIN SODIUM 5000 UNIT(S): 5000 INJECTION INTRAVENOUS; SUBCUTANEOUS at 18:16

## 2018-09-12 RX ADMIN — Medication 600 MILLIGRAM(S): at 07:00

## 2018-09-12 RX ADMIN — SIMETHICONE 80 MILLIGRAM(S): 80 TABLET, CHEWABLE ORAL at 18:15

## 2018-09-12 RX ADMIN — Medication 100 MILLIGRAM(S): at 11:56

## 2018-09-12 RX ADMIN — Medication 600 MILLIGRAM(S): at 06:30

## 2018-09-12 RX ADMIN — Medication 600 MILLIGRAM(S): at 18:15

## 2018-09-12 RX ADMIN — Medication 975 MILLIGRAM(S): at 07:00

## 2018-09-12 RX ADMIN — OXYCODONE HYDROCHLORIDE 5 MILLIGRAM(S): 5 TABLET ORAL at 20:50

## 2018-09-12 RX ADMIN — Medication 600 MILLIGRAM(S): at 00:00

## 2018-09-12 RX ADMIN — MAGNESIUM HYDROXIDE 30 MILLILITER(S): 400 TABLET, CHEWABLE ORAL at 20:16

## 2018-09-12 RX ADMIN — Medication 600 MILLIGRAM(S): at 18:16

## 2018-09-12 RX ADMIN — OXYCODONE HYDROCHLORIDE 5 MILLIGRAM(S): 5 TABLET ORAL at 00:00

## 2018-09-12 RX ADMIN — Medication 100 MILLIGRAM(S): at 20:16

## 2018-09-12 RX ADMIN — OXYCODONE HYDROCHLORIDE 5 MILLIGRAM(S): 5 TABLET ORAL at 17:00

## 2018-09-12 RX ADMIN — Medication 975 MILLIGRAM(S): at 06:30

## 2018-09-12 RX ADMIN — OXYCODONE HYDROCHLORIDE 5 MILLIGRAM(S): 5 TABLET ORAL at 07:00

## 2018-09-12 RX ADMIN — Medication 975 MILLIGRAM(S): at 11:55

## 2018-09-12 RX ADMIN — Medication 975 MILLIGRAM(S): at 18:16

## 2018-09-12 RX ADMIN — HEPARIN SODIUM 5000 UNIT(S): 5000 INJECTION INTRAVENOUS; SUBCUTANEOUS at 06:30

## 2018-09-12 NOTE — PROGRESS NOTE ADULT - SUBJECTIVE AND OBJECTIVE BOX
SUBJECTIVE:    Pain:well controlled  Complaints:none    MILESTONES:    Alert and oriented x 3  [ X ]  Out of bed/ ambulating. [ X ]  Flatus: [  ]  Postive [  X] Negative BS +  Bowel movement  [  ] Positive [X  ] Negative   Voiding [  X] Due to void [  ]   Ewing/Indwelling catheter in place [  ]  Diet: Regular [X  ]  Clears [  ]  NPO [  ]    Infant feeding:  Breast [X  ]   Bottle [  ]  Both [  ]  Feeding related inssues and/or concerns:none      OBJECTIVE:  T(C): 36.4 (18 @ 05:22), Max: 37 (18 @ 14:25)  HR: 84 (18 @ 05:22) (81 - 99)  BP: 104/67 (18 @ 05:22) (92/55 - 104/67)  RR: 18 (18 @ 05:22) (16 - 18)  SpO2: 99% (18 @ 05:22) (99% - 100%)  Wt(kg): --                        12.6   13.28 )-----------( 168      ( 11 Sep 2018 06:00 )             39.2         MEDICATIONS  (STANDING):  acetaminophen   Tablet .. 975 milliGRAM(s) Oral every 6 hours  diphtheria/tetanus/pertussis (acellular) Vaccine (ADAcel) 0.5 milliLiter(s) IntraMuscular once  ferrous    sulfate 325 milliGRAM(s) Oral daily  heparin  Injectable 5000 Unit(s) SubCutaneous every 12 hours  ibuprofen  Tablet. 600 milliGRAM(s) Oral every 6 hours  oxyCODONE    IR 5 milliGRAM(s) Oral every 3 hours  prenatal multivitamin 1 Tablet(s) Oral daily    MEDICATIONS  (PRN):  diphenhydrAMINE   Capsule 25 milliGRAM(s) Oral every 6 hours PRN Itching  docusate sodium 100 milliGRAM(s) Oral two times a day PRN Stool Softening  glycerin Suppository - Adult 1 Suppository(s) Rectal at bedtime PRN Constipation  lanolin Ointment 1 Application(s) Topical every 3 hours PRN Sore Nipples  oxyCODONE    IR 5 milliGRAM(s) Oral every 4 hours PRN Severe Pain (7 - 10)  simethicone 80 milliGRAM(s) Chew every 4 hours PRN Gas        ASSESSMENT:  32y  y/o G 1  P 1   PO Day# 2       Delivery: Primary [X  ]    Repeat [  ]                                       Indication of procedure:Breech  Condition: Stable  Past Medical History significant for: HPI:none    Current Issues:none  Heart:   RRR                           Lungs:clear  Breasts:  Soft [X  ]   Engorged [  ]  Nipples:NL  Abdomen: Soft [X  ] , distended [  ] nontender [  ]   Bowel sounds :  Present [ X ]  Absent [  ]   Fundus firm [  ]  Boggy [  ]  Abdominal incision: Clean, dry and intact [ X ]  Staples [  ] Steri Strips [X  ] Dermabond [  ] Sutures [  ]  Patient wearing abdominal binder for support.  Vaginal: Lochia:  Heavy [  ]  Moderate [  ]   Scant [X  ]  Extremities: Edema [ 2+ ] negative Jeannine's Sign [ X ] Nontender Scott  [X  ] Positive pedal pulses [X  ]  Other relevant physical exam findings:      PLAN:  Plan: Increase ambulation, analgesia PRN and pain medication protocol standing oxycodone, ibuprofen and acetaminophen.  Diet: Regular diet  MOM, Colace and Mylicon prn  Continue routine post-operative and postpartum care.

## 2018-09-13 VITALS
OXYGEN SATURATION: 100 % | TEMPERATURE: 98 F | RESPIRATION RATE: 18 BRPM | SYSTOLIC BLOOD PRESSURE: 111 MMHG | HEART RATE: 87 BPM | DIASTOLIC BLOOD PRESSURE: 63 MMHG

## 2018-09-13 RX ADMIN — Medication 975 MILLIGRAM(S): at 00:25

## 2018-09-13 RX ADMIN — Medication 975 MILLIGRAM(S): at 06:47

## 2018-09-13 RX ADMIN — SIMETHICONE 80 MILLIGRAM(S): 80 TABLET, CHEWABLE ORAL at 06:20

## 2018-09-13 RX ADMIN — Medication 975 MILLIGRAM(S): at 01:00

## 2018-09-13 RX ADMIN — Medication 600 MILLIGRAM(S): at 06:48

## 2018-09-13 RX ADMIN — Medication 100 MILLIGRAM(S): at 11:57

## 2018-09-13 RX ADMIN — Medication 600 MILLIGRAM(S): at 11:57

## 2018-09-13 RX ADMIN — Medication 100 MILLIGRAM(S): at 06:20

## 2018-09-13 RX ADMIN — Medication 600 MILLIGRAM(S): at 00:26

## 2018-09-13 RX ADMIN — Medication 975 MILLIGRAM(S): at 11:56

## 2018-09-13 RX ADMIN — SIMETHICONE 80 MILLIGRAM(S): 80 TABLET, CHEWABLE ORAL at 00:25

## 2018-09-13 RX ADMIN — Medication 975 MILLIGRAM(S): at 06:20

## 2018-09-13 RX ADMIN — HEPARIN SODIUM 5000 UNIT(S): 5000 INJECTION INTRAVENOUS; SUBCUTANEOUS at 06:20

## 2018-09-13 RX ADMIN — Medication 600 MILLIGRAM(S): at 01:00

## 2018-09-13 RX ADMIN — OXYCODONE HYDROCHLORIDE 5 MILLIGRAM(S): 5 TABLET ORAL at 07:42

## 2018-09-13 RX ADMIN — Medication 600 MILLIGRAM(S): at 06:20

## 2018-09-13 NOTE — PROGRESS NOTE ADULT - SUBJECTIVE AND OBJECTIVE BOX
Patient assessed at 0858.  Subjective  Pain: Patient denies any pain at the time of assessment. Pain being managed well by pain management protocol.  Complaints: None  Milestones:  Alert and orientedx3. Out of bed ambulating. Positive flatus. Negative bowel movement, denies the urge. Voiding.  Tolerating a regular diet.  Infant feeding: breastfeeding, formula feeding and using breast pump  Feeding related issues and/or concerns: infant had weight loss, seen by lactation, fabiano in this am, referred to lactation before discharge today.     Objective  Vital Signs:  Vital Signs Last 24 Hrs  T(C): 36.7 (13 Sep 2018 06:30), Max: 36.9 (12 Sep 2018 14:32)  T(F): 98 (13 Sep 2018 06:30), Max: 98.4 (12 Sep 2018 14:32)  HR: 87 (13 Sep 2018 06:30) (78 - 96)  BP: 111/63 (13 Sep 2018 06:30) (94/52 - 111/63)  BP(mean): --  RR: 18 (13 Sep 2018 06:30) (16 - 18)  SpO2: 100% (13 Sep 2018 06:30) (100% - 100%)    Labs:                 12.6  13.28 )---------------( 168       ( 2018 06:00 )                 39.2  Blood Type: ABpositive  Rubella: Immune  RPR: nonreactive    Medications  MEDICATIONS  (STANDING):  acetaminophen   Tablet .. 975 milliGRAM(s) Oral every 6 hours  diphtheria/tetanus/pertussis (acellular) Vaccine (ADAcel) 0.5 milliLiter(s) IntraMuscular once  ferrous    sulfate 325 milliGRAM(s) Oral daily  heparin  Injectable 5000 Unit(s) SubCutaneous every 12 hours  ibuprofen  Tablet. 600 milliGRAM(s) Oral every 6 hours  oxyCODONE    IR 5 milliGRAM(s) Oral every 3 hours  prenatal multivitamin 1 Tablet(s) Oral daily    MEDICATIONS  (PRN):  diphenhydrAMINE   Capsule 25 milliGRAM(s) Oral every 6 hours PRN Itching  docusate sodium 100 milliGRAM(s) Oral two times a day PRN Stool Softening  glycerin Suppository - Adult 1 Suppository(s) Rectal at bedtime PRN Constipation  lanolin Ointment 1 Application(s) Topical every 3 hours PRN Sore Nipples  magnesium hydroxide Suspension 30 milliLiter(s) Oral daily PRN Constipation  oxyCODONE    IR 5 milliGRAM(s) Oral every 4 hours PRN Severe Pain (7 - 10)  simethicone 80 milliGRAM(s) Chew every 4 hours PRN Gas    Assessment  33y/o     Day#3    primary post-operative  section delivery for breech                                        Condition: Stable  Past Medical History significant for: deviated septum repair , mitral valve prolapse seen by cardiology tonsillectomy at 15y/o  Current Issues: EBL 800cc  Lung sounds clear bilaterally.  Breasts: engorged, nontender  Nipples: intact  Abdomen: Soft, nondistended and nontender. Bowel sounds present. Fundus firm  Abdominal incision: Clean, dry and intact with steri strips.   Vaginal: Lochia light rubra  Extremities: Slight edema noted bilaterally to lower extremities, negative Jeannine's Sign, nontender. Positive pedal pulses  Other relevant physical exam findings: none    Plan  Continue routine post-operative and postpartum care  Increase ambulation, analgesia PRN and pain medication protocol standing oxycodone, ibuprofen and acetaminophen.  Encouraged to wear abdominal binder for support and to use incentive spirometer  Discussed breast, nipple, engorgement care for a breastfeeding mother.   Discharge to home today. Discussed discharge planning.

## 2021-04-19 NOTE — PROGRESS NOTE ADULT - PROBLEM SELECTOR PLAN 1
- Continue regular diet.  - Increase ambulation.  - Continue motrin, tylenol, oxycodone PRN for pain control.    Celestina Encinas PGY-1 Double O-Z Flap Text: The defect edges were debeveled with a #15 scalpel blade.  Given the location of the defect, shape of the defect and the proximity to free margins a Double O-Z flap was deemed most appropriate.  Using a sterile surgical marker, an appropriate transposition flap was drawn incorporating the defect and placing the expected incisions within the relaxed skin tension lines where possible. The area thus outlined was incised deep to adipose tissue with a #15 scalpel blade.  The skin margins were undermined to an appropriate distance in all directions utilizing iris scissors.

## 2023-03-09 NOTE — PATIENT PROFILE OB - PURPOSEFUL PROACTIVE ROUNDING
[facility]  Ophthalmology  [address], [address]  [place] [address]  Phone: [phone]  Fax:   Follow Up Time: 2 weeks     [facility]  Ophthalmology  [address], [address]  [place] [address]  Phone: [phone]  Fax:   Follow Up Time: 1 week     Patient

## 2023-07-14 NOTE — DISCHARGE NOTE OB - AVOID DOUCHING OR TAMPONS UNTIL YOUR POSTPARTUM VISIT
Spoke to patient. Notified of results below and that Bactrim DS twice daily x 5 days is being prescribed for infection, pending urine culture.   Explained that once urine culture grows out, which can take up to 72 hour, she will get a call back.   If infection will not be cleared with this antibiotic, then a new one would be prescribed.   Discussed that antibiotic can cause stomach upset or yeast infections, but using a probiotic can help prevent this.   Also that medication should be taken with food to prevent GI upset.  Explained that she needs to make sure that she is wiping from FRONT to BACK when using restroom and that she drinks plenty of water.   Patient verbalized understanding.   Script e-scribed to pharmacy.     Statement Selected